# Patient Record
Sex: MALE | Race: WHITE | NOT HISPANIC OR LATINO | Employment: FULL TIME | URBAN - METROPOLITAN AREA
[De-identification: names, ages, dates, MRNs, and addresses within clinical notes are randomized per-mention and may not be internally consistent; named-entity substitution may affect disease eponyms.]

---

## 2021-04-14 ENCOUNTER — HOSPITAL ENCOUNTER (EMERGENCY)
Facility: HOSPITAL | Age: 41
Discharge: HOME | End: 2021-04-14
Attending: EMERGENCY MEDICINE | Admitting: EMERGENCY MEDICINE
Payer: COMMERCIAL

## 2021-04-14 VITALS
OXYGEN SATURATION: 97 % | WEIGHT: 180 LBS | DIASTOLIC BLOOD PRESSURE: 98 MMHG | SYSTOLIC BLOOD PRESSURE: 133 MMHG | TEMPERATURE: 97.2 F | RESPIRATION RATE: 18 BRPM | HEART RATE: 72 BPM

## 2021-04-14 DIAGNOSIS — B34.9 VIRAL SYNDROME: Primary | ICD-10-CM

## 2021-04-14 LAB
FLUAV RNA RESP QL NAA+PROBE: NEGATIVE
FLUBV RNA RESP QL NAA+PROBE: NEGATIVE
RSV RNA RESP QL NAA+PROBE: NEGATIVE
SARS-COV-2 RNA RESP QL NAA+PROBE: NEGATIVE

## 2021-04-14 PROCEDURE — 99283 EMERGENCY DEPT VISIT LOW MDM: CPT

## 2021-04-14 PROCEDURE — 0241U HB NFCT DS VIR RESP RNA 4 TRGT: CPT | Performed by: EMERGENCY MEDICINE

## 2021-04-14 PROCEDURE — 99284 EMERGENCY DEPT VISIT MOD MDM: CPT | Performed by: EMERGENCY MEDICINE

## 2021-04-14 NOTE — ED PROVIDER NOTES
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)     This is a [id number]  states that he was exposed to somebody with COVID-19 at work yesterday he has mild headache and some body aches although he was recently on a long hike denies any fevers or cough his room air saturation is 100% he is a nonsmoker  Denies any loss of smell or taste no nausea vomiting or diarrhea      History provided by:  Patient  Medical Problem  Location:  Generalized  Quality:  Achiness  Severity:  Mild  Onset quality:  Gradual  Duration:  1 day  Timing:  Constant  Progression:  Worsening  Chronicity:  New  Context: Body aches and headache after recent exposure to a person at work with known COVID-19  Relieved by:  Nothing  Worsened by:  Nothing  Associated symptoms: headaches and myalgias    Associated symptoms: no cough and no fever        Cannot display prior to admission medications because the patient has not been admitted in this contact  History reviewed  No pertinent past medical history  History reviewed  No pertinent surgical history  History reviewed  No pertinent family history  I have reviewed and agree with the history as documented  E-Cigarette/Vaping    E-Cigarette Use Never User      E-Cigarette/Vaping Substances    Nicotine No     THC No     CBD No     Flavoring No     Other No     Unknown No      Social History     Tobacco Use    Smoking status: Never Smoker    Smokeless tobacco: Never Used   Substance Use Topics    Alcohol use: Never     Frequency: Never    Drug use: Never       Review of Systems   Constitutional: Negative for fever  Respiratory: Negative for cough  Musculoskeletal: Positive for myalgias  Neurological: Positive for headaches  All other systems reviewed and are negative  Physical Exam  Physical Exam  Vitals signs and nursing note reviewed     Constitutional: General: He is not in acute distress  Appearance: He is not ill-appearing, toxic-appearing or diaphoretic  HENT:      Head: Normocephalic and atraumatic  Right Ear: External ear normal       Left Ear: External ear normal    Eyes:      General: No scleral icterus  Right eye: No discharge  Left eye: No discharge  Extraocular Movements: Extraocular movements intact  Pupils: Pupils are equal, round, and reactive to light  Neck:      Musculoskeletal: Normal range of motion and neck supple  No neck rigidity or muscular tenderness  Cardiovascular:      Rate and Rhythm: Normal rate and regular rhythm  Pulses: Normal pulses  Heart sounds: Normal heart sounds  No murmur  No friction rub  No gallop  Pulmonary:      Effort: Pulmonary effort is normal  No respiratory distress  Breath sounds: Normal breath sounds  No stridor  No wheezing, rhonchi or rales  Abdominal:      General: Abdomen is flat  Bowel sounds are normal  There is no distension  Palpations: Abdomen is soft  Tenderness: There is no abdominal tenderness  There is no guarding or rebound  Musculoskeletal: Normal range of motion  General: No swelling, tenderness, deformity or signs of injury  Right lower leg: No edema  Left lower leg: No edema  Skin:     General: Skin is warm and dry  Findings: No rash  Neurological:      General: No focal deficit present  Mental Status: He is alert and oriented to person, place, and time  Cranial Nerves: No cranial nerve deficit  Sensory: No sensory deficit  Motor: No weakness  Coordination: Coordination normal       Gait: Gait normal       Deep Tendon Reflexes: Reflexes normal    Psychiatric:         Mood and Affect: Mood normal          Behavior: Behavior normal          Thought Content:  Thought content normal          Vital Signs  ED Triage Vitals [04/14/21 1641]   Temperature Pulse Respirations Blood Pressure SpO2   (!) 97 2 °F (36 2 °C) 72 18 133/98 97 %      Temp Source Heart Rate Source Patient Position - Orthostatic VS BP Location FiO2 (%)   Tympanic Monitor Lying Right arm --      Pain Score       No Pain           Vitals:    04/14/21 1641   BP: 133/98   Pulse: 72   Patient Position - Orthostatic VS: Lying         Visual Acuity      ED Medications  Medications - No data to display    Diagnostic Studies  Results Reviewed     Procedure Component Value Units Date/Time    COVID19, Influenza A/B, RSV PCR, [facility] [[id number]] Collected: 04/14/21 1649    Lab Status: No result Specimen: Nares from Nasopharyngeal Swab                  No orders to display              Procedures  Procedures         ED Course                             SBIRT 20yo+      Most Recent Value   SBIRT (24 yo +)   In order to provide better care to our patients, we are screening all of our patients for alcohol and drug use  Would it be okay to ask you these screening questions? No Filed at: 04/14/2021 1643   Initial Alcohol Screen: US AUDIT-C    1  How often do you have a drink containing alcohol?  0 Filed at: 04/14/2021 1643   2  How many drinks containing alcohol do you have on a typical day you are drinking? 0 Filed at: 04/14/2021 1643   3a  Male UNDER 65: How often do you have five or more drinks on one occasion? 0 Filed at: 04/14/2021 1643   3b  FEMALE Any Age, or MALE 65+: How often do you have 4 or more drinks on one occassion? 0 Filed at: 04/14/2021 1643   Audit-C Score  0 Filed at: 04/14/2021 1643   ANDRÉS: How many times in the past year have you    Used an illegal drug or used a prescription medication for non-medical reasons?   Never Filed at: 04/14/2021 1643                    MDM    Disposition  Final diagnoses:   Viral syndrome - COVID exposure     Time reflects when diagnosis was documented in both MDM as applicable and the Disposition within this note     Time User Action Codes Description Comment    4/14/2021  4:44 PM Dangelo Godfrey Add [B34 9] Viral syndrome     4/14/2021  4:45 PM Dangelo Godfrey Modify [B34 9] Viral syndrome COVID exposure      ED Disposition     ED Disposition Condition Date/Time Comment    Discharge Stable Wed Apr 14, 2021  4:44 PM Dioni Dahl discharge to home/self care  Follow-up Information     Follow up With Specialties Details Why Contact Info Additional Information     [address] Emergency Department Emergency Medicine  As needed [address] [place], [phone]  [address] [facility], [address] [place] [address] [address]          Patient's Medications    No medications on file     No discharge procedures on file      PDMP Review     None          ED Provider  Electronically Signed by           Shyann Barahona DO  04/14/21 2018

## 2021-04-14 NOTE — Clinical Note
Juanmary janekristy Aviles was seen and treated in our emergency department on 4/14/2021  Diagnosis:     Kamini Rich  may return to work on return date  He may return on this date: 04/24/2021         If you have any questions or concerns, please don't hesitate to call        Andres Wolfe DO    ______________________________           _______________          _______________  Hospital Representative                              Date                                Time

## 2021-06-12 ENCOUNTER — HOSPITAL ENCOUNTER (EMERGENCY)
Facility: HOSPITAL | Age: 41
Discharge: HOME | End: 2021-06-12
Attending: EMERGENCY MEDICINE | Admitting: EMERGENCY MEDICINE
Payer: COMMERCIAL

## 2021-06-12 VITALS
OXYGEN SATURATION: 97 % | TEMPERATURE: 98.9 F | HEART RATE: 77 BPM | SYSTOLIC BLOOD PRESSURE: 148 MMHG | RESPIRATION RATE: 17 BRPM | WEIGHT: 188 LBS | HEIGHT: 69 IN | DIASTOLIC BLOOD PRESSURE: 86 MMHG | BODY MASS INDEX: 27.85 KG/M2

## 2021-06-12 DIAGNOSIS — M25.512 LEFT SHOULDER PAIN: Primary | ICD-10-CM

## 2021-06-12 PROCEDURE — 99283 EMERGENCY DEPT VISIT LOW MDM: CPT

## 2021-06-12 PROCEDURE — 99284 EMERGENCY DEPT VISIT MOD MDM: CPT | Performed by: EMERGENCY MEDICINE

## 2021-06-12 RX ORDER — LIDOCAINE 50 MG/G
1 PATCH TOPICAL ONCE
Status: DISCONTINUED | OUTPATIENT
Start: 2021-06-12 | End: 2021-06-12 | Stop reason: HOSPADM

## 2021-06-12 RX ORDER — LIDOCAINE 50 MG/G
1 PATCH TOPICAL DAILY
Qty: 15 PATCH | Refills: 0 | Status: SHIPPED | OUTPATIENT
Start: 2021-06-12

## 2021-06-12 RX ADMIN — LIDOCAINE 1 PATCH: 50 PATCH TOPICAL at 12:25

## 2021-06-12 NOTE — ED PROVIDER NOTES
History  Chief Complaint   Patient presents with    Shoulder Pain     Patient presents to the ER with left shoulder pain x 2 days  Patient reports having an xray yesterday and increased pain today  Patient is unable to range the extremity without being in intense pain  65-year-old male no major medical history presenting due to left shoulder pain  Patient states had sudden severe left-sided shoulder pain around 2 days ago  He says it feels like an achy sensation that is localized to anterolateral aspect of his left deltoid  States that the pain has been constant and is worse with certain movements  He is unsure the specific inciting event, denies any known trauma  Says he was seen at an urgent care yesterday where they did x-rays and provided him with a shot" which only worked temporarily and when the pain came back it was severe  Says he took Tylenol yesterday as well but denies taking any medication today  Denies any numbness or tingling to the distal extremity  Denies any new injury or trauma  None       History reviewed  No pertinent past medical history  History reviewed  No pertinent surgical history  History reviewed  No pertinent family history  I have reviewed and agree with the history as documented  E-Cigarette/Vaping    E-Cigarette Use Never User      E-Cigarette/Vaping Substances    Nicotine No     THC No     CBD No     Flavoring No     Other No     Unknown No      Social History     Tobacco Use    Smoking status: Never Smoker    Smokeless tobacco: Never Used   Substance Use Topics    Alcohol use: Not Currently    Drug use: Never       Review of Systems   Constitutional: Negative for chills and fever  Respiratory: Negative for shortness of breath  Cardiovascular: Negative for chest pain  Musculoskeletal: Positive for myalgias  Negative for arthralgias, back pain, joint swelling and neck pain  Skin: Negative for color change and rash     All other systems reviewed and are negative  Physical Exam  Physical Exam  Vitals signs and nursing note reviewed  Constitutional:       Appearance: He is well-developed  HENT:      Head: Normocephalic and atraumatic  Eyes:      Conjunctiva/sclera: Conjunctivae normal    Neck:      Musculoskeletal: Neck supple  Cardiovascular:      Rate and Rhythm: Normal rate and regular rhythm  Pulmonary:      Effort: Pulmonary effort is normal  No respiratory distress  Breath sounds: Normal breath sounds  Abdominal:      General: There is no distension  Musculoskeletal:         General: Tenderness present  No swelling, deformity or signs of injury  Comments: Full passive ROM  Pain with flexion of shoulder  Tenderness to lateral aspect of deltoid  Sensation intact   Skin:     General: Skin is warm and dry  Neurological:      General: No focal deficit present  Mental Status: He is alert and oriented to person, place, and time     Psychiatric:         Mood and Affect: Mood normal          Behavior: Behavior normal          Vital Signs  ED Triage Vitals [06/12/21 1140]   Temperature Pulse Respirations Blood Pressure SpO2   98 9 °F (37 2 °C) 84 18 141/96 99 %      Temp Source Heart Rate Source Patient Position - Orthostatic VS BP Location FiO2 (%)   Oral Monitor Lying Right arm --      Pain Score       7           Vitals:    06/12/21 1140 06/12/21 1145 06/12/21 1200   BP: 141/96 141/96 148/86   Pulse: 84 81 77   Patient Position - Orthostatic VS: Lying Lying Lying         Visual Acuity  Visual Acuity      Most Recent Value   L Pupil Size (mm)  3   R Pupil Size (mm)  3          ED Medications  Medications - No data to display    Diagnostic Studies  Results Reviewed     None                 No orders to display              Procedures  Procedures         ED Course                             SBIRT 20yo+      Most Recent Value   SBIRT (24 yo +)   In order to provide better care to our patients, we are screening all of our patients for alcohol and drug use  Would it be okay to ask you these screening questions? No Filed at: 06/12/2021 1219                    [facility]  Number of Diagnoses or Management Options  Left shoulder pain:   Diagnosis management comments: No sign of dislocation  Patient agreeable to not reimaging, as he had Xrays yesterday at outside facility that were negative  Provided lidoderm patch and prescription for voltaren  Possibly rotator cuff injury vs muscle strain  Provided ortho contact for outpatient follow up  Safe for discharge at this time  Disposition  Final diagnoses:   Left shoulder pain     Time reflects when diagnosis was documented in both MDM as applicable and the Disposition within this note     Time User Action Codes Description Comment    6/12/2021 12:35 PM Jarrettyane Melendez Add [U46 077] Left shoulder pain       ED Disposition     ED Disposition Condition Date/Time Comment    Discharge Stable Sat Jun 12, 2021 12:36 PM Talha Manning discharge to home/self care  Follow-up Information     Follow up With Specialties Details Why Contact Info Additional [facility] Orthopedic Surgery Schedule an appointment as soon as possible for a visit   [phone] [facility] [phone]  [address] [place] [address] Specialists [facility], [address] [place] [address]          Discharge Medication List as of 6/12/2021 12:39 PM      START taking these medications    Details   Diclofenac Sodium (VOLTAREN) 1 % Apply 2 g topically 4 (four) times a day, Starting Sat 6/12/2021, Normal      lidocaine (LIDODERM) 5 % Apply 1 patch topically daily Remove & Discard patch within 12 hours or as directed by MD, Starting Sat 6/12/2021, Normal           No discharge procedures on file      PDMP Review     None          ED Provider  Electronically Signed by           Ashley Mayorga DO  06/12/21 2040

## 2021-06-12 NOTE — Clinical Note
Jass Jimenez was seen and treated in our emergency department on 6/12/2021  Light lifting as tolerated  Jass Jimenez will follow up with an orthopedic specialist in regards to left shoulder pain    Diagnosis:     Tera    He may return on this date: If you have any questions or concerns, please don't hesitate to call        Malik Bell, DO    ______________________________           _______________          _______________  Hospital Representative                              Date                                Time

## 2021-06-12 NOTE — DISCHARGE INSTRUCTIONS
Please schedule an appointment with an orthopedic specialist  Contact information has been provided

## 2021-06-12 NOTE — Clinical Note
Hallie Kim was seen and treated in our emergency department on 6/12/2021  Light lifting as tolerated  Hallie Kmi will follow up with an orthopedic specialist in regards to left shoulder pain    Diagnosis:     Antonella Jenkins  may return to work on return date  He may return on this date: 06/13/2021         If you have any questions or concerns, please don't hesitate to call        Kacy Oakes,     ______________________________           _______________          _______________  Hospital Representative                              Date                                Time

## 2021-06-19 VITALS
SYSTOLIC BLOOD PRESSURE: 124 MMHG | BODY MASS INDEX: 27.25 KG/M2 | WEIGHT: 184 LBS | HEIGHT: 69 IN | DIASTOLIC BLOOD PRESSURE: 80 MMHG

## 2021-06-19 DIAGNOSIS — M25.512 ACUTE PAIN OF LEFT SHOULDER: Primary | ICD-10-CM

## 2021-06-19 PROCEDURE — 99204 OFFICE O/P NEW MOD 45 MIN: CPT | Performed by: FAMILY MEDICINE

## 2021-06-19 RX ORDER — NAPROXEN 500 MG/1
500 TABLET ORAL 2 TIMES DAILY PRN
Qty: 40 TABLET | Refills: 0 | Status: SHIPPED | OUTPATIENT
Start: 2021-06-19

## 2021-06-19 NOTE — LETTER
June 19, 2021     Patient: Jluis Joe   YOB: 1980   Date of Visit: 6/19/2021       To Whom it May Concern:    lJuis Joe is under my professional care  He was seen in my office on 6/19/2021  He may return to work on 6/20/21 full duty no restrictions  If you have any questions or concerns, please don't hesitate to call           Sincerely,          Anais Alatorre III,         CC: Jluis Joe

## 2021-06-19 NOTE — PROGRESS NOTES
1  Acute pain of left shoulder  naproxen (NAPROSYN) 500 mg tablet    [facility]     Orders Placed This Encounter   Procedures    [facility]        Imaging Studies (I personally reviewed images in PACS and report):      IMPRESSION:  Left shoulder pain  Remote injury [employer] young adulthood  Differential diagnosis:  Rotator cuff impingement  Labral tear  Biceps tendonitis    Repeat X-ray next visit: None    Return if symptoms worsen or fail to improve  Patient Instructions     Explained the patient that he likely has subacromial impingement the rotator cuff without complete tear and not requiring surgical intervention  Alternatively he may have a labral tear from a remote injury when he was in the [facility]  Explained that labral tears do not require surgical intervention  Recommended trial conservative treatment with physical therapy  If patient does not improve within the next 6 weeks he may return for repeat evaluation and consideration of MRI 1st MRI arthrogram of the left shoulder and consideration of surgical intervention  Rotator Cuff Injury Exercises   AMBULATORY CARE:   What you need to know about rotator cuff injury exercises:  Exercises help improve shoulder movement and strength, and decrease pain  Your physical therapist or healthcare provider will tell you when to start doing the exercises  He or she will tell you how often to do them  You will need to start slowly to prevent more injury  You will move through several levels over time as you get stronger and more flexible  Safety guidelines:   · Always warm up before you do the exercises  Walk or ride a stationary bike for 5 to 10 minutes to help you warm up  · Do not put your arm over your head until directed  You will need to wait until your injury has healed  The movement of some exercises could continue until your arm is over your head  You will need to stop the movement where directed      · Stop if you feel pain  You may feel some tight or stiff areas when you start  This should get better as you continue the exercises  You should not feel pain  Pain means you are not ready to do the exercise yet  Stop and call your physical therapist or healthcare provider right away  · Always work both rotator cuffs  Even if your injury is only on 1 side, it is important to do each exercise on both sides  This helps prevent injury and maintain balance in your shoulders and back  · Posture is important  Your physical therapist or healthcare provider will show you the proper posture for each exercise  You will be shown how to pull your shoulders back and down to engage the correct muscles  Remember not to let your shoulders shrug during an exercise unless it is part of the movement  How to do stretching exercises: You will not feel every exercise in your shoulder area  You may feel some of the stretches in your back, side, or upper arm muscles  You need to work muscles in and around your rotator cuffs and down your arms  This helps stabilize your shoulders  Your physical therapist or healthcare provider will tell you how long to hold each stretch  He or she will also tell you how many times to repeat each stretch during an exercise session  You may be told to do only certain exercises, or to do them in a specific order  The following are general directions to help you remember the exercises you are taught:  · Pendulum swings:  Lean forward and rest your arm on a table  Do not round your back or lock your knees during the exercise  Let your other arm hang freely by your side  Gently swing your free arm forward and backward, side to side, and in circles  · Crossover arm stretch:  Relax your shoulders  Hold your upper arm with the opposite hand  Pull your arm across your chest until you feel a stretch  Hold the stretch  Return to the starting position           · Sleeper stretch:  Lie on your side on a firm, flat surface  Bend the elbow of your top arm 90°  Use your other hand to slowly push your arm down  Stop when you feel a stretch at the back of your shoulder  Hold the stretch  Slowly return to the starting position  · Shoulder movement, up and down: This exercise may also be called shoulder extension  Sit in a chair that has a back but no armrests  Raise your arm like you are reaching for the wall in front of you  Continue to raise the arm until it is over your head, or as high as directed  Bring your arm back down to your side  Bring it back as far as possible behind your body  Return your arm to the starting position  · Shoulder movement, side to side: These movements may be called flexion, internal rotation, and external rotation  Sit in a chair that has a back but no armrests  Raise your arm to the side and then up over your head as far as directed  Return your arm to your side  Bring your arm across the front of your body and reach for the opposite shoulder  Return your arm to the starting position  · Shoulder rolls:  Stand and raise both shoulders toward your ears  Lower them to the starting position  Relax your shoulders  Pull your shoulders back  Then relax them again  Roll your shoulders in a smooth [place]  Then roll your shoulders in a smooth [place] in the other direction  · Wall reach exercise: This may also be called a flexion stretch  Stand facing a wall  Slowly walk your fingers up the wall until you feel a stretch  Hold the stretch  Return to the starting position  · Arm reach exercise:  Lie on your back with your legs straight  Reach your arms like you are trying to touch the ceiling  Reach as high as you can so you feel a stretch in the back of your arms  Hold the stretch  Then lower your arms to your sides  · Elbow bends:  Stand with your arms down to your sides  Keep your palm facing forward   Bend your elbow and try to touch your shoulder with your fingertips  Return your arm to the starting position  · Up the back stretch:  Stand and put both arms behind your back  Put one hand under the other  Move the bottom hand and slowly push the upper hand up toward your head  You should feel a stretch in the front of your arm and shoulder  Be careful not to push too hard  Hold the stretch  Then return to the starting position  · Triceps stretch:  Stand and drop your forearm down your back so your hand is pointing to the ground behind you  Your elbow should be pointing at the ceiling  Take your other hand and place it on your elbow  Gently and slowly push on the elbow until you feel a stretch in the back of your arm  Hold the stretch  Let go of your elbow and relax your arm  You may be shown how to do this stretch with a towel  The towel can be pulled gently with a hand behind your back at waist level  How to do strengthening exercises:  Strengthening exercises may include handheld weights or resistance bands  Your physical therapist or healthcare provider will tell you how much weight or resistance to use  The general guide is to use light weights or low resistance and to do a high number of repetitions  You may be told to do only certain exercises, or to do them in a specific order  The following are general directions to help you remember the exercises you are taught:  · Scapular squeeze:  Stand with your arms at your sides  Squeeze your shoulder blades together and hold this position  Then relax the muscles  Keep your shoulder back during the entire exercise  · Wall pushups: This exercise is similar to a pushup done on the ground  The goal is to use your back and shoulder muscles to bring your upper body toward and away from the wall  Stand facing a wall  Put your hands on the wall  Bend your elbows to bring your upper body toward the wall  Straighten your arms to return to the starting position   Keep your feet close enough to the wall that you do not take a step when you bend your elbows  · Standing row with exercise band:  Wrap the exercise band around a heavy, stable object at waist level  Make loop in the end of the band to create a handle, if needed  Hold the handle or loop and pull the band straight back toward your hip  Keep your shoulder down  Squeeze your shoulder blade  Hold this position  Then slowly return to the starting position  · External rotation with arm abducted 90 degrees:  Wrap the exercise band around a heavy, stable object at waist level  Make loop in the end of the band to create a handle, if needed  Stand and hold the handle or loop  Bend your elbow and raise your arm to shoulder height  Keep your arm in this position  Raise your hand like you are pointing at the ceiling  Slowly return to the starting position  You may also be shown how to do this exercise lying down and with a weight  · Shoulder abduction with weight:  Stand and hold a weight in your hand with your palm facing your body  Slowly raise your arm to the side with your thumb pointing up  Then raise your arm as far as you can without pain  Hold this position  Then return to the starting position  · Shoulder abduction with exercise band:  Wrap the exercise band around a heavy, stable object near your foot  Grab the band  Keep your arm straight  Slowly raise your arm to the side with your thumb pointing up  Then, slowly pull the band as far as you can without pain  Slowly return to the starting position  · Shoulder adduction with weight:  Lie on your back on a firm surface  Hold a weight in your hand at your shoulder  Slowly raise your arm toward the ceiling and straighten your elbow  Hold this position  Then slowly return to the starting position  · Shoulder adduction with exercise band:  Wrap the exercise band around a heavy, stable object  Stand and face away from where the band is anchored   Hold each end of the band in both hands with your elbows bent  Your elbows should not be behind your body  Keep your arms parallel to the floor and slowly straighten your elbows  Hold this position  Slowly return to the starting position  Call your doctor or physical therapist if:   · You have sharp or worsening pain during exercise or at rest     · You have questions or concerns about your rotator cuff injury exercises  © Copyright [address] Information is for End User's use only and may not be sold, redistributed or otherwise used for commercial purposes  All illustrations and images included in CareNotes® are the copyrighted property of A D A M , Inc  or [facility] [employer]   The above information is an  only  It is not intended as medical advice for individual conditions or treatments  Talk to your doctor, nurse or pharmacist before following any medical regimen to see if it is safe and effective for you  CHIEF COMPLAINT:  Left shoulder pain    HPI:  Joanie Jackson is a 46 y o  male  who presents for       Visit 6/19/2021 :  Past medical history significant for intermittent left-sided shoulder pain since early adulthood in the [facility]  Patient describes left shoulder pain today ongoing for approximately 1 week exacerbated by lifting  Denies any recent specific injury event  Stabbing pain but now mostly throbbing severe at 1st but now mild to moderate intensity exacerbated by lifting overhead  Patient was evaluated at the [facility] [address] as well as at [facility]  Did have an x-ray at [facility] urgent care which did not show any fracture  Report and image unavailable for review  In the past has had steroid injections in the shoulder as well as Voltaren gel  Patient did have corticosteroid injections left shoulder urgent care within the past 1 week  Associated symptoms do include numbness into the left hand  Denies any neck pain    Does point anterior lateral shoulder source of pain  Review of Systems   Constitutional: Negative for chills, fever and unexpected weight change  HENT: Negative for hearing loss, nosebleeds and sore throat  Eyes: Negative for pain, redness and visual disturbance  Respiratory: Negative for cough, shortness of breath and wheezing  Cardiovascular: Negative for chest pain, palpitations and leg swelling  Gastrointestinal: Negative for abdominal distention, nausea and vomiting  Endocrine: Negative for polydipsia and polyuria  Genitourinary: Negative for dysuria and hematuria  Skin: Negative for rash and wound  Neurological: Negative for dizziness, numbness and headaches  Psychiatric/Behavioral: Negative for decreased concentration and suicidal ideas  Following history reviewed and update:    History reviewed  No pertinent past medical history  History reviewed  No pertinent surgical history  Social History   Social History     Substance and Sexual Activity   Alcohol Use Not Currently     Social History     Substance and Sexual Activity   Drug Use Never     Social History     Tobacco Use   Smoking Status Never Smoker   Smokeless Tobacco Never Used     No family history on file  No Known Allergies       Physical Exam  /80   Ht 5' 9" (1 753 m)   Wt 83 5 kg (184 lb)   BMI 27 17 kg/m²     Constitutional:  see vital signs  Gen: well-developed, normocephalic/atraumatic, well-groomed  Eyes: No inflammation or discharge of conjunctiva or lids; sclera clear   Pharynx: no inflammation, lesion, or mass of lips  Neck: supple, no masses, non-distended  MSK: no inflammation, lesion, mass, or clubbing of nails and digits except for other than mentioned below  SKIN: no visible rashes or skin lesions  Pulmonary/Chest: Effort normal  No respiratory distress     NEURO: cranial nerves grossly intact  PSYCH:  Alert and oriented to person, place, and time; recent and remote memory intact; mood normal, no depression, anxiety, or agitation, judgment and insight good and intact     Ortho Exam    Cervical  ROM: intact  Midline spinous process tenderness: None  Muscular Tenderness: None  Sensation UE Bilateral:  C5: normal  C6: normal  C7: normal  C8: normal  T1: normal  Strength UE: 5/5 elbow, wrist, fingers bilateral      LEFT SHOULDER:  Erythema: no  Swelling: no  Increased Warmth: no    Tenderness: + mild anterior    ROM  Touchdown sign: intact  External Rotation: intact  Internal Rotation: intact    Strength  Abduction: 5/5  ER: 5/5  IR: 5/5    Drop-Arm: negative  Emptycan: negative  Belly Press:   Lift-off Test:    Sanches: negative  Neer: negative  Cross-Arm:   Speeds: +    Internal Impingement:+  (crank position pain)    Labral Crank Test : +  (abducted 90, axial load, guided IR & Er)    Modified Labral Shift:  Negative  (seated, ER, abduction, axial load, guided abd/add)    [place]'s Test:  Equivocal  (FF 90, abd 15, resist thumbs up-, resist thumbs down+)    Apprehension:  Negative  Nanda's Relocation Maneuver:    RIGHT SHOULDER:  Strength  Abduction: 5/5  ER: 5/5  IR: 5/5    ROM  Touchdown sign: intact    Empty can: negative           Procedures

## 2021-06-19 NOTE — PATIENT INSTRUCTIONS
Explained the patient that he likely has subacromial impingement the rotator cuff without complete tear and not requiring surgical intervention  Alternatively he may have a labral tear from a remote injury when he was in the [facility]  Explained that labral tears do not require surgical intervention  Recommended trial conservative treatment with physical therapy  If patient does not improve within the next 6 weeks he may return for repeat evaluation and consideration of MRI 1st MRI arthrogram of the left shoulder and consideration of surgical intervention  Rotator Cuff Injury Exercises   AMBULATORY CARE:   What you need to know about rotator cuff injury exercises:  Exercises help improve shoulder movement and strength, and decrease pain  Your physical therapist or healthcare provider will tell you when to start doing the exercises  He or she will tell you how often to do them  You will need to start slowly to prevent more injury  You will move through several levels over time as you get stronger and more flexible  Safety guidelines:   · Always warm up before you do the exercises  Walk or ride a stationary bike for 5 to 10 minutes to help you warm up  · Do not put your arm over your head until directed  You will need to wait until your injury has healed  The movement of some exercises could continue until your arm is over your head  You will need to stop the movement where directed  · Stop if you feel pain  You may feel some tight or stiff areas when you start  This should get better as you continue the exercises  You should not feel pain  Pain means you are not ready to do the exercise yet  Stop and call your physical therapist or healthcare provider right away  · Always work both rotator cuffs  Even if your injury is only on 1 side, it is important to do each exercise on both sides  This helps prevent injury and maintain balance in your shoulders and back  · Posture is important    Your physical therapist or healthcare provider will show you the proper posture for each exercise  You will be shown how to pull your shoulders back and down to engage the correct muscles  Remember not to let your shoulders shrug during an exercise unless it is part of the movement  How to do stretching exercises: You will not feel every exercise in your shoulder area  You may feel some of the stretches in your back, side, or upper arm muscles  You need to work muscles in and around your rotator cuffs and down your arms  This helps stabilize your shoulders  Your physical therapist or healthcare provider will tell you how long to hold each stretch  He or she will also tell you how many times to repeat each stretch during an exercise session  You may be told to do only certain exercises, or to do them in a specific order  The following are general directions to help you remember the exercises you are taught:  · Pendulum swings:  Lean forward and rest your arm on a table  Do not round your back or lock your knees during the exercise  Let your other arm hang freely by your side  Gently swing your free arm forward and backward, side to side, and in circles  · Crossover arm stretch:  Relax your shoulders  Hold your upper arm with the opposite hand  Pull your arm across your chest until you feel a stretch  Hold the stretch  Return to the starting position  · Sleeper stretch:  Lie on your side on a firm, flat surface  Bend the elbow of your top arm 90°  Use your other hand to slowly push your arm down  Stop when you feel a stretch at the back of your shoulder  Hold the stretch  Slowly return to the starting position  · Shoulder movement, up and down: This exercise may also be called shoulder extension  Sit in a chair that has a back but no armrests  Raise your arm like you are reaching for the wall in front of you  Continue to raise the arm until it is over your head, or as high as directed   Bring your arm back down to your side  Bring it back as far as possible behind your body  Return your arm to the starting position  · Shoulder movement, side to side: These movements may be called flexion, internal rotation, and external rotation  Sit in a chair that has a back but no armrests  Raise your arm to the side and then up over your head as far as directed  Return your arm to your side  Bring your arm across the front of your body and reach for the opposite shoulder  Return your arm to the starting position  · Shoulder rolls:  Stand and raise both shoulders toward your ears  Lower them to the starting position  Relax your shoulders  Pull your shoulders back  Then relax them again  Roll your shoulders in a smooth [place]  Then roll your shoulders in a smooth [place] in the other direction  · Wall reach exercise: This may also be called a flexion stretch  Stand facing a wall  Slowly walk your fingers up the wall until you feel a stretch  Hold the stretch  Return to the starting position  · Arm reach exercise:  Lie on your back with your legs straight  Reach your arms like you are trying to touch the ceiling  Reach as high as you can so you feel a stretch in the back of your arms  Hold the stretch  Then lower your arms to your sides  · Elbow bends:  Stand with your arms down to your sides  Keep your palm facing forward  Bend your elbow and try to touch your shoulder with your fingertips  Return your arm to the starting position  · Up the back stretch:  Stand and put both arms behind your back  Put one hand under the other  Move the bottom hand and slowly push the upper hand up toward your head  You should feel a stretch in the front of your arm and shoulder  Be careful not to push too hard  Hold the stretch  Then return to the starting position  · Triceps stretch:  Stand and drop your forearm down your back so your hand is pointing to the ground behind you  Your elbow should be pointing at the ceiling  Take your other hand and place it on your elbow  Gently and slowly push on the elbow until you feel a stretch in the back of your arm  Hold the stretch  Let go of your elbow and relax your arm  You may be shown how to do this stretch with a towel  The towel can be pulled gently with a hand behind your back at waist level  How to do strengthening exercises:  Strengthening exercises may include handheld weights or resistance bands  Your physical therapist or healthcare provider will tell you how much weight or resistance to use  The general guide is to use light weights or low resistance and to do a high number of repetitions  You may be told to do only certain exercises, or to do them in a specific order  The following are general directions to help you remember the exercises you are taught:  · Scapular squeeze:  Stand with your arms at your sides  Squeeze your shoulder blades together and hold this position  Then relax the muscles  Keep your shoulder back during the entire exercise  · Wall pushups: This exercise is similar to a pushup done on the ground  The goal is to use your back and shoulder muscles to bring your upper body toward and away from the wall  Stand facing a wall  Put your hands on the wall  Bend your elbows to bring your upper body toward the wall  Straighten your arms to return to the starting position  Keep your feet close enough to the wall that you do not take a step when you bend your elbows  · Standing row with exercise band:  Wrap the exercise band around a heavy, stable object at waist level  Make loop in the end of the band to create a handle, if needed  Hold the handle or loop and pull the band straight back toward your hip  Keep your shoulder down  Squeeze your shoulder blade  Hold this position  Then slowly return to the starting position           · External rotation with arm abducted 90 degrees:  Wrap the exercise band around a heavy, stable object at waist level  Make loop in the end of the band to create a handle, if needed  Stand and hold the handle or loop  Bend your elbow and raise your arm to shoulder height  Keep your arm in this position  Raise your hand like you are pointing at the ceiling  Slowly return to the starting position  You may also be shown how to do this exercise lying down and with a weight  · Shoulder abduction with weight:  Stand and hold a weight in your hand with your palm facing your body  Slowly raise your arm to the side with your thumb pointing up  Then raise your arm as far as you can without pain  Hold this position  Then return to the starting position  · Shoulder abduction with exercise band:  Wrap the exercise band around a heavy, stable object near your foot  Grab the band  Keep your arm straight  Slowly raise your arm to the side with your thumb pointing up  Then, slowly pull the band as far as you can without pain  Slowly return to the starting position  · Shoulder adduction with weight:  Lie on your back on a firm surface  Hold a weight in your hand at your shoulder  Slowly raise your arm toward the ceiling and straighten your elbow  Hold this position  Then slowly return to the starting position  · Shoulder adduction with exercise band:  Wrap the exercise band around a heavy, stable object  Stand and face away from where the band is anchored  Hold each end of the band in both hands with your elbows bent  Your elbows should not be behind your body  Keep your arms parallel to the floor and slowly straighten your elbows  Hold this position  Slowly return to the starting position  Call your doctor or physical therapist if:   · You have sharp or worsening pain during exercise or at rest     · You have questions or concerns about your rotator cuff injury exercises      © Copyright [address] Information is for End User's use only and may not be sold, redistributed or otherwise used for commercial purposes  All illustrations and images included in CareNotes® are the copyrighted property of [employer]  or [employer]  The above information is an  only  It is not intended as medical advice for individual conditions or treatments  Talk to your doctor, nurse or pharmacist before following any medical regimen to see if it is safe and effective for you

## 2021-08-23 VITALS
SYSTOLIC BLOOD PRESSURE: 117 MMHG | BODY MASS INDEX: 27.25 KG/M2 | WEIGHT: 184 LBS | HEIGHT: 69 IN | DIASTOLIC BLOOD PRESSURE: 62 MMHG

## 2021-08-23 DIAGNOSIS — M25.512 ACUTE PAIN OF LEFT SHOULDER: Primary | ICD-10-CM

## 2021-08-23 PROCEDURE — 99212 OFFICE O/P EST SF 10 MIN: CPT | Performed by: FAMILY MEDICINE

## 2021-08-23 NOTE — PROGRESS NOTES
1  Acute pain of left shoulder       No orders of the defined types were placed in this encounter  Imaging Studies (I personally reviewed images in PACS and report):      IMPRESSION:  Left shoulder pain  Remote injury [employer] young adulthood  Differential diagnosis:  Rotator cuff impingement  Labral tear  Biceps tendonitis      Repeat X-ray next visit: None    Return if symptoms worsen or fail to improve  Patient Instructions               CHIEF COMPLAINT:  F/u left shoulder pain    HPI:  Thomas Brannon is a 46 y o  male  who presents for       Visit 8/23/2021 :  Significantly improved 90%          Review of Systems   Constitutional: Negative for chills and fever  Neurological: Negative for weakness  Following history reviewed and update:    History reviewed  No pertinent past medical history  History reviewed  No pertinent surgical history  Social History   Social History     Substance and Sexual Activity   Alcohol Use Not Currently     Social History     Substance and Sexual Activity   Drug Use Never     Social History     Tobacco Use   Smoking Status Never Smoker   Smokeless Tobacco Never Used     History reviewed  No pertinent family history  No Known Allergies       Physical Exam  /62   Ht 5' 9" (1 753 m)   Wt 83 5 kg (184 lb)   BMI 27 17 kg/m²     Constitutional:  see vital signs  Gen: well-developed, normocephalic/atraumatic, well-groomed  Eyes: No inflammation or discharge of conjunctiva or lids; sclera clear   Pharynx: no inflammation, lesion, or mass of lips  Neck: supple, no masses, non-distended  MSK: no inflammation, lesion, mass, or clubbing of nails and digits except for other than mentioned below  SKIN: no visible rashes or skin lesions  Pulmonary/Chest: Effort normal  No respiratory distress     NEURO: cranial nerves grossly intact  PSYCH:  Alert and oriented to person, place, and time; recent and remote memory intact; mood normal, no depression, anxiety, or agitation, judgment and insight good and intact     Ortho Exam    LEFT SHOULDER:  Erythema: no  Swelling: no  Increased Warmth: no    Tenderness:   none    ROM  Touchdown sign: intact  External Rotation: intact  Internal Rotation: intact    Strength  Abduction: 5/5  ER: 5/5  IR: 5/5    Drop-Arm: negative  Emptycan: negative  Belly Press:   Lift-off Test:    Sanches: negative  Neer: negative  Cross-Arm:   Speeds:     Internal Impingement:  (crank position pain)    Labral Crank Test : +   (abducted 90, axial load, guided IR & Er)    Modified Labral Shift: +  (seated, ER, abduction, axial load, guided abd/add)    Mobridge's Test: +  (FF 90, abd 15, resist thumbs up-, resist thumbs down+)    Apprehension:  Nanda's Relocation Maneuver:    RIGHT SHOULDER:  Strength  Abduction: 5/5  ER: 5/5  IR: 5/5    ROM  Touchdown sign: intact    Empty can: negative        Procedures